# Patient Record
Sex: FEMALE | Race: WHITE | NOT HISPANIC OR LATINO | Employment: OTHER | URBAN - METROPOLITAN AREA
[De-identification: names, ages, dates, MRNs, and addresses within clinical notes are randomized per-mention and may not be internally consistent; named-entity substitution may affect disease eponyms.]

---

## 2019-11-12 ENCOUNTER — HOSPITAL ENCOUNTER (OUTPATIENT)
Dept: MAMMOGRAPHY | Facility: HOSPITAL | Age: 49
Discharge: HOME | End: 2019-11-12
Attending: NURSE PRACTITIONER

## 2020-09-22 ENCOUNTER — HOSPITAL ENCOUNTER (OUTPATIENT)
Dept: OTHER | Facility: HOSPITAL | Age: 50
Discharge: HOME | End: 2020-09-22
Attending: PSYCHIATRY & NEUROLOGY

## 2020-09-22 LAB
ALBUMIN SERPL-MCNC: 4.1 G/DL (ref 3.5–5)
ALBUMIN/GLOB SERPL: 1.4 {RATIO} (ref 1.4–2.6)
ALP SERPL-CCNC: 114 U/L (ref 42–98)
ALT SERPL-CCNC: 10 U/L (ref 10–40)
ANION GAP SERPL CALC-SCNC: 15 MMOL/L (ref 8–19)
AST SERPL-CCNC: 15 U/L (ref 15–50)
BASOPHILS # BLD AUTO: 0.11 10*3/UL (ref 0–0.2)
BASOPHILS NFR BLD AUTO: 1 % (ref 0–3)
BILIRUB SERPL-MCNC: 0.34 MG/DL (ref 0.2–1.3)
BUN SERPL-MCNC: 5 MG/DL (ref 5–25)
BUN/CREAT SERPL: 6 {RATIO} (ref 6–20)
CALCIUM SERPL-MCNC: 9.5 MG/DL (ref 8.7–10.4)
CHLORIDE SERPL-SCNC: 101 MMOL/L (ref 99–111)
CONV ABS IMM GRAN: 0.05 10*3/UL (ref 0–0.2)
CONV CO2: 27 MMOL/L (ref 22–32)
CONV IMMATURE GRAN: 0.4 % (ref 0–1.8)
CONV TOTAL PROTEIN: 7 G/DL (ref 6.3–8.2)
CREAT UR-MCNC: 0.78 MG/DL (ref 0.5–0.9)
DEPRECATED RDW RBC AUTO: 45.3 FL (ref 36.4–46.3)
EOSINOPHIL # BLD AUTO: 0.2 10*3/UL (ref 0–0.7)
EOSINOPHIL # BLD AUTO: 1.7 % (ref 0–7)
ERYTHROCYTE [DISTWIDTH] IN BLOOD BY AUTOMATED COUNT: 13.2 % (ref 11.7–14.4)
GFR SERPLBLD BASED ON 1.73 SQ M-ARVRAT: >60 ML/MIN/{1.73_M2}
GLOBULIN UR ELPH-MCNC: 2.9 G/DL (ref 2–3.5)
GLUCOSE SERPL-MCNC: 81 MG/DL (ref 65–99)
HCT VFR BLD AUTO: 41.2 % (ref 37–47)
HGB BLD-MCNC: 13.6 G/DL (ref 12–16)
LYMPHOCYTES # BLD AUTO: 3.68 10*3/UL (ref 1–5)
LYMPHOCYTES NFR BLD AUTO: 32 % (ref 20–45)
MCH RBC QN AUTO: 30.8 PG (ref 27–31)
MCHC RBC AUTO-ENTMCNC: 33 G/DL (ref 33–37)
MCV RBC AUTO: 93.2 FL (ref 81–99)
MONOCYTES # BLD AUTO: 0.97 10*3/UL (ref 0.2–1.2)
MONOCYTES NFR BLD AUTO: 8.4 % (ref 3–10)
NEUTROPHILS # BLD AUTO: 6.48 10*3/UL (ref 2–8)
NEUTROPHILS NFR BLD AUTO: 56.5 % (ref 30–85)
NRBC CBCN: 0 % (ref 0–0.7)
OSMOLALITY SERPL CALC.SUM OF ELEC: 284 MOSM/KG (ref 273–304)
PLATELET # BLD AUTO: 336 10*3/UL (ref 130–400)
PMV BLD AUTO: 9.6 FL (ref 9.4–12.3)
POTASSIUM SERPL-SCNC: 4.4 MMOL/L (ref 3.5–5.3)
RBC # BLD AUTO: 4.42 10*6/UL (ref 4.2–5.4)
SODIUM SERPL-SCNC: 139 MMOL/L (ref 135–147)
WBC # BLD AUTO: 11.49 10*3/UL (ref 4.8–10.8)

## 2020-11-11 ENCOUNTER — OFFICE VISIT CONVERTED (OUTPATIENT)
Dept: PODIATRY | Facility: CLINIC | Age: 50
End: 2020-11-11
Attending: PODIATRIST

## 2021-05-14 VITALS
OXYGEN SATURATION: 100 % | DIASTOLIC BLOOD PRESSURE: 92 MMHG | HEIGHT: 63 IN | TEMPERATURE: 97.1 F | HEART RATE: 87 BPM | WEIGHT: 144 LBS | SYSTOLIC BLOOD PRESSURE: 158 MMHG | BODY MASS INDEX: 25.52 KG/M2

## 2021-08-09 ENCOUNTER — TRANSCRIBE ORDERS (OUTPATIENT)
Dept: ADMINISTRATIVE | Facility: HOSPITAL | Age: 51
End: 2021-08-09

## 2021-08-09 DIAGNOSIS — Z12.31 ENCOUNTER FOR SCREENING MAMMOGRAM FOR MALIGNANT NEOPLASM OF BREAST: Primary | ICD-10-CM

## 2021-10-21 ENCOUNTER — HOSPITAL ENCOUNTER (OUTPATIENT)
Dept: MAMMOGRAPHY | Facility: HOSPITAL | Age: 51
Discharge: HOME | End: 2021-10-21
Admitting: NURSE PRACTITIONER

## 2021-10-21 DIAGNOSIS — Z12.31 ENCOUNTER FOR SCREENING MAMMOGRAM FOR MALIGNANT NEOPLASM OF BREAST: ICD-10-CM

## 2021-10-21 PROCEDURE — 77067 SCR MAMMO BI INCL CAD: CPT

## 2021-10-21 PROCEDURE — 77063 BREAST TOMOSYNTHESIS BI: CPT

## 2023-08-01 ENCOUNTER — TRANSCRIBE ORDERS (OUTPATIENT)
Dept: ADMINISTRATIVE | Facility: HOSPITAL | Age: 53
End: 2023-08-01
Payer: MEDICARE

## 2023-08-01 DIAGNOSIS — Z12.31 SCREENING MAMMOGRAM FOR HIGH-RISK PATIENT: Primary | ICD-10-CM

## 2024-07-31 ENCOUNTER — TRANSCRIBE ORDERS (OUTPATIENT)
Dept: ADMINISTRATIVE | Facility: HOSPITAL | Age: 54
End: 2024-07-31
Payer: MEDICARE

## 2024-07-31 DIAGNOSIS — Z12.39 ENCOUNTER FOR OTHER SCREENING FOR MALIGNANT NEOPLASM OF BREAST: Primary | ICD-10-CM

## 2024-08-09 ENCOUNTER — TRANSCRIBE ORDERS (OUTPATIENT)
Dept: ADMINISTRATIVE | Facility: HOSPITAL | Age: 54
End: 2024-08-09
Payer: MEDICARE

## 2024-08-09 DIAGNOSIS — F17.200 TOBACCO USE DISORDER: Primary | ICD-10-CM

## 2024-08-15 ENCOUNTER — HOSPITAL ENCOUNTER (OUTPATIENT)
Dept: MAMMOGRAPHY | Facility: HOSPITAL | Age: 54
Discharge: HOME | End: 2024-08-15
Admitting: NURSE PRACTITIONER
Payer: MEDICARE

## 2024-08-15 DIAGNOSIS — Z12.39 ENCOUNTER FOR OTHER SCREENING FOR MALIGNANT NEOPLASM OF BREAST: ICD-10-CM

## 2024-08-15 PROCEDURE — 77063 BREAST TOMOSYNTHESIS BI: CPT

## 2024-08-15 PROCEDURE — 77067 SCR MAMMO BI INCL CAD: CPT

## 2024-08-23 ENCOUNTER — HOSPITAL ENCOUNTER (OUTPATIENT)
Dept: CT IMAGING | Facility: HOSPITAL | Age: 54
Discharge: HOME | End: 2024-08-23
Payer: MEDICARE

## 2024-08-23 DIAGNOSIS — F17.200 TOBACCO USE DISORDER: ICD-10-CM

## 2024-08-23 PROCEDURE — 71271 CT THORAX LUNG CANCER SCR C-: CPT

## 2025-07-15 ENCOUNTER — TRANSCRIBE ORDERS (OUTPATIENT)
Dept: ADMINISTRATIVE | Facility: HOSPITAL | Age: 55
End: 2025-07-15
Payer: MEDICARE

## 2025-07-15 DIAGNOSIS — Z12.31 VISIT FOR SCREENING MAMMOGRAM: Primary | ICD-10-CM

## 2025-07-21 ENCOUNTER — TELEPHONE (OUTPATIENT)
Dept: ORTHOPEDIC SURGERY | Facility: CLINIC | Age: 55
End: 2025-07-21
Payer: MEDICARE

## 2025-07-21 ENCOUNTER — HOSPITAL ENCOUNTER (EMERGENCY)
Facility: HOSPITAL | Age: 55
Discharge: HOME | End: 2025-07-21
Attending: EMERGENCY MEDICINE | Admitting: EMERGENCY MEDICINE
Payer: MEDICARE

## 2025-07-21 ENCOUNTER — APPOINTMENT (OUTPATIENT)
Dept: GENERAL RADIOLOGY | Facility: HOSPITAL | Age: 55
End: 2025-07-21
Payer: MEDICARE

## 2025-07-21 VITALS
WEIGHT: 140.43 LBS | SYSTOLIC BLOOD PRESSURE: 139 MMHG | RESPIRATION RATE: 19 BRPM | HEART RATE: 90 BPM | BODY MASS INDEX: 24.88 KG/M2 | DIASTOLIC BLOOD PRESSURE: 82 MMHG | HEIGHT: 63 IN | TEMPERATURE: 98.1 F | OXYGEN SATURATION: 100 %
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
## 2025-07-21 DIAGNOSIS — S62.025A CLOSED NONDISPLACED FRACTURE OF MIDDLE THIRD OF SCAPHOID BONE OF LEFT WRIST, INITIAL ENCOUNTER: ICD-10-CM

## 2025-07-21 DIAGNOSIS — S63.502A SPRAIN OF LEFT WRIST, INITIAL ENCOUNTER: Primary | ICD-10-CM

## 2025-07-21 PROCEDURE — 73110 X-RAY EXAM OF WRIST: CPT

## 2025-07-21 PROCEDURE — 99283 EMERGENCY DEPT VISIT LOW MDM: CPT

## 2025-07-21 RX ORDER — ATORVASTATIN CALCIUM 80 MG/1
1 TABLET, FILM COATED ORAL DAILY
COMMUNITY

## 2025-07-21 RX ORDER — CHLORTHALIDONE 25 MG/1
1 TABLET ORAL DAILY
COMMUNITY

## 2025-07-21 RX ORDER — CLONAZEPAM 0.5 MG/1
0.5 TABLET ORAL
COMMUNITY
Start: 2025-06-16

## 2025-07-21 RX ORDER — CLINDAMYCIN PHOSPHATE 11.9 MG/ML
SOLUTION TOPICAL SEE ADMIN INSTRUCTIONS
COMMUNITY

## 2025-07-21 RX ORDER — SUMATRIPTAN SUCCINATE 100 MG/1
100 TABLET ORAL
COMMUNITY

## 2025-07-21 RX ORDER — LISINOPRIL 40 MG/1
1 TABLET ORAL DAILY
COMMUNITY

## 2025-07-21 RX ORDER — DICLOFENAC SODIUM 75 MG/1
75 TABLET, DELAYED RELEASE ORAL 2 TIMES DAILY
COMMUNITY

## 2025-07-21 RX ORDER — PROPRANOLOL HYDROCHLORIDE 120 MG/1
120 CAPSULE, EXTENDED RELEASE ORAL DAILY
COMMUNITY
Start: 2025-06-16

## 2025-07-21 RX ORDER — LEVOTHYROXINE SODIUM 75 UG/1
1 TABLET ORAL DAILY
COMMUNITY

## 2025-07-21 RX ORDER — AMLODIPINE BESYLATE 5 MG/1
10 TABLET ORAL DAILY
COMMUNITY

## 2025-07-21 NOTE — TELEPHONE ENCOUNTER
Closed nondisplaced fracture of middle third of scaphoid bone of left wrist, initial encounter  - XRAY 7/21/25 - PN 7/21/25 ER

## 2025-07-21 NOTE — ED PROVIDER NOTES
Time: 8:41 AM EDT  Date of encounter:  7/21/2025  Independent Historian/Clinical History and Information was obtained by:   Patient and Family    History is limited by: N/A    Chief Complaint: Fall onto outstretched left hand, wrist pain      History of Present Illness:  Patient is a 54 y.o. year old female with history of MS occasional falls who presents to the emergency department for evaluation of stumble and fall onto her knees and outstretched left hand, causing wrist pain.    She now has an aching or throbbing pain along the radial aspect of the left wrist and mild swelling.    She wanted to make sure she did not fracture anything.    To a lesser extent she also has some bumps and bruises on her bilateral anterior knees and right elbow.  No significant head injury or loss of consciousness or neck pain.  Not on blood thinners.      Patient Care Team  Primary Care Provider: Meghan Montes APRN    Past Medical History:     Allergies   Allergen Reactions    Interferon Beta-1a Hives     Past Medical History:   Diagnosis Date    Hypertension     MS (multiple sclerosis)      History reviewed. No pertinent surgical history.  History reviewed. No pertinent family history.    Home Medications:  Prior to Admission medications    Medication Sig Start Date End Date Taking? Authorizing Provider   amitriptyline (ELAVIL) 25 MG tablet Take 1 tablet by mouth At Night As Needed. 6/16/25  Yes Abdiaziz Ty MD   clonazePAM (KlonoPIN) 0.5 MG tablet Take 1 tablet by mouth. 6/16/25  Yes Abdiaziz Ty MD   propranolol LA (INDERAL LA) 120 MG 24 hr capsule Take 1 capsule by mouth Daily. 6/16/25  Yes Abdiaziz Ty MD   amLODIPine (NORVASC) 5 MG tablet Take 1 tablet by mouth Daily.    Abdiaziz Ty MD   atorvastatin (LIPITOR) 80 MG tablet Take 1 tablet by mouth Daily.    Abdiaziz Ty MD   chlorthalidone (HYGROTON) 25 MG tablet Take 1 tablet by mouth Daily.    Abdiaziz Ty MD  "  clindamycin (CLEOCIN T) 1 % external solution Apply  topically to the appropriate area as directed See Admin Instructions. Apply topically to the affected area twice daily    Abdiaziz Ty MD   diclofenac (VOLTAREN) 75 MG EC tablet Take 1 tablet by mouth 2 (Two) Times a Day.    Abdiaziz Ty MD   levothyroxine (SYNTHROID, LEVOTHROID) 75 MCG tablet Take 1 tablet by mouth Daily.    Abdiaziz Ty MD   lisinopril (PRINIVIL,ZESTRIL) 40 MG tablet Take 1 tablet by mouth Daily.    Abdiaziz Ty MD   SUMAtriptan (IMITREX) 100 MG tablet Take 1 tablet by mouth Every 2 (Two) Hours As Needed.    Abdiaziz Ty MD        Social History:   Social History     Tobacco Use    Smoking status: Unknown   Substance Use Topics    Drug use: Defer         Review of Systems:  Review of Systems   I performed a 10 point review of systems which was all negative, except for the positives found in the HPI above.  Physical Exam:  /82 (BP Location: Right arm, Patient Position: Sitting)   Pulse 90   Temp 98.1 °F (36.7 °C) (Oral)   Resp 19   Ht 160 cm (63\")   Wt 63.7 kg (140 lb 6.9 oz)   LMP  (LMP Unknown)   SpO2 100%   BMI 24.88 kg/m²     Physical Exam   General: Awake alert and in mild to moderate pain holding left wrist and pain  HEENT: Head normocephalic atraumatic, eyes PERRLA EOMI, nose normal, oropharynx normal.    Neck: Supple full range of motion, no meningismus, no lymphadenopathy    Heart: Regular rate and rhythm, no murmurs or rubs, 2+ radial pulses bilaterally    Lungs: Clear to auscultation bilaterally without wheezes or crackles, no respiratory distress    Abdomen: Soft, nontender, nondistended, no rebound or guarding    Skin: Warm, dry, no rash    Musculoskeletal: Mildly reduced range of motion due to pain and swelling of the left wrist but she is neurovascular intact distally, she has reproducible tenderness over the anatomic snuffbox/scaphoid bone, no lower extremity edema, knee " exam notable for some superficial bruising to the anterior knee or patella but no significant tenderness or pain with range of motion.    Neurologic: Oriented x3, no motor deficits no sensory deficits    Psychiatric: Mood appears stable, no psychosis            Medical Decision Making:      Comorbidities that affect care:    MS, frequent falls    External Notes reviewed:    None      The following orders were placed and all results were independently analyzed by me:  Orders Placed This Encounter   Procedures    XR Wrist 3+ View Left    Ambulatory Referral to Orthopedic Surgery    Obtain & Apply The Following- Upper extremity; Wrist cock-up       Medications Given in the Emergency Department:  Medications - No data to display     ED Course:         Labs:    Lab Results (last 24 hours)       ** No results found for the last 24 hours. **             Imaging:    XR Wrist 3+ View Left  Result Date: 7/21/2025  XR WRIST 3+ VW LEFT Date of Exam: 7/21/2025 7:40 AM EDT Indication: Fall Comparison: None available. Findings: Evaluation of the left wrist demonstrates subtle transverse trabecular lucency through the body of the scaphoid noted on the AP view only. Fracture cannot be confirmed on additional views but there is some dorsal soft tissue swelling noted. Remaining carpal bones appear intact. The distal radius and ulna are grossly unremarkable.     Impression: 1. Questionable nondisplaced fracture through the body of the scaphoid. Correlate with physical examination. Dedicated scaphoid repeat views could be considered or if there is high clinical suspicion confirmation with CT could be obtained. Electronically Signed: Bianca Tovar MD  7/21/2025 8:13 AM EDT  Workstation ID: [id number]        Differential Diagnosis and Discussion:    Orthopedic Injuries: Differential diagnosis includes but is not limited to fractures, soft tissue injuries, dislocations, contusions, ligamentous injuries, tendon injuries, nerve injuries,  compartment syndrome, bursitis, and vascular injuries.    PROCEDURES:    X-ray were performed in the emergency department and all X-ray impressions were independently interpreted by me.    No orders to display       Procedures    MDM     Amount and/or Complexity of Data Reviewed  Tests in the radiology section of CPT®: reviewed           This patient is a 54-year-old female with MS and frequent falls who stumbled and fell injuring her left wrist.    She has tenderness over the anatomic snuffbox and I reviewed and interpreted her x-rays of the wrist with one of the views showing possible nondisplaced or hairline fracture of the scaphoid.    Since she is tender there on exam, I will go ahead and immobilize her in a wrist splint and give her recommendations for RICE, NSAIDs, follow-up with orthopedic surgery and I will place a referral.                Patient Care Considerations:          Consultants/Shared Management Plan:        Social Determinants of Health:    Patient has presented with family members who are responsible, reliable and will ensure follow up care.      Disposition and Care Coordination:    Discharged: The patient is suitable and stable for discharge with no need for consideration of admission.    I have explained the patient´s condition, diagnoses and treatment plan based on the information available to me at this time. I have answered questions and addressed any concerns. The patient has a good  understanding of the patient´s diagnosis, condition, and treatment plan as can be expected at this point. The vital signs have been stable. The patient´s condition is stable and appropriate for discharge from the emergency department.      The patient will pursue further outpatient evaluation with the primary care physician or other designated or consulting physician as outlined in the discharge instructions. They are agreeable to this plan of care and follow-up instructions have been explained in detail.  The patient has received these instructions in written format and has expressed an understanding of the discharge instructions. The patient is aware that any significant change in condition or worsening of symptoms should prompt an immediate return to this or the closest emergency department or call to 911.  I have explained discharge medications and the need for follow up with the patient/caretakers. This was also printed in the discharge instructions. Patient was discharged with the following medications and follow up:      Medication List      No changes were made to your prescriptions during this visit.      No follow-up provider specified.     Final diagnoses:   Sprain of left wrist, initial encounter   Closed nondisplaced fracture of middle third of scaphoid bone of left wrist, initial encounter   Fall on same level from slipping, tripping or stumbling, initial encounter        ED Disposition       ED Disposition   Discharge    Condition   Stable    Comment   --               This medical record created using voice recognition software.             Juan Manuel Manning MD  07/21/25 [id number]

## 2025-07-21 NOTE — DISCHARGE INSTRUCTIONS
Your x-rays look like you may have a hairline fracture of the scaphoid bone in the left wrist for which you should keep the wrist immobilized in the splint for now and call orthopedic surgery clinic for a follow-up appointment to be seen.    You can also take some Tylenol or ibuprofen for pain and ice packs and elevate the wrist because you definitely also have some sprained ligaments that will take time to heal.

## 2025-07-25 ENCOUNTER — TRANSCRIBE ORDERS (OUTPATIENT)
Dept: ADMINISTRATIVE | Facility: HOSPITAL | Age: 55
End: 2025-07-25
Payer: MEDICARE

## 2025-07-25 ENCOUNTER — OFFICE VISIT (OUTPATIENT)
Dept: ORTHOPEDIC SURGERY | Facility: CLINIC | Age: 55
End: 2025-07-25
Payer: MEDICARE

## 2025-07-25 VITALS
DIASTOLIC BLOOD PRESSURE: 91 MMHG | BODY MASS INDEX: 24.8 KG/M2 | HEART RATE: 109 BPM | OXYGEN SATURATION: 96 % | WEIGHT: 140 LBS | HEIGHT: 63 IN | SYSTOLIC BLOOD PRESSURE: 159 MMHG

## 2025-07-25 DIAGNOSIS — S62.002A CLOSED NONDISPLACED FRACTURE OF SCAPHOID OF LEFT WRIST, UNSPECIFIED PORTION OF SCAPHOID, INITIAL ENCOUNTER: Primary | ICD-10-CM

## 2025-07-25 DIAGNOSIS — S62.102S FRACTURE OF UNSPECIFIED CARPAL BONE, LEFT WRIST, SEQUELA: Primary | ICD-10-CM

## 2025-07-25 NOTE — PROGRESS NOTES
"Chief Complaint  Initial Evaluation of the Left Wrist       Subjective      Imani Meier presents to [facility] for an evaluation  of her left wrist. She states she has balance issues and vertigo and states she fell and landed on her left wrist. She went to the emergency room on 07/21/25 where she had x-rays done and placed into a brace. She denies any prior surgery to her left wrist.     Allergies   Allergen Reactions    Interferon Beta-1a Hives        Social History     Socioeconomic History    Marital status:    Tobacco Use    Smoking status: Every Day     Types: Cigarettes     Passive exposure: Current    Smokeless tobacco: Never   Vaping Use    Vaping status: Some Days    Substances: Nicotine   Substance and Sexual Activity    Alcohol use: Never    Drug use: Defer    Sexual activity: Defer        I reviewed the patient's chief complaint, history of present illness, review of systems, past medical history, surgical history, family history, social history, medications, and allergy list.     Review of Systems     Constitutional: Denies fevers, chills, weight loss  Cardiovascular: Denies chest pain, shortness of breath  Skin: Denies rashes, acute skin changes  Neurologic: Denies headache, loss of consciousness  MSK: left wrist pain       Vital Signs:   /91   Pulse 109   Ht 160 cm (63\")   Wt 63.5 kg (140 lb)   SpO2 96%   BMI 24.80 kg/m²          Physical Exam  General: Alert. No acute distress    Ortho Exam        Left upper extremity: limited range of motion secondary to pain to her left wrist, tender to palpation to the wrist, able to wiggle her fingers and thumb, neurovascularly intact, positive  pulses, sensation intact to the medial, radial and ulnar nerve        Orthopedic Injury Treatment    Date/Time: 7/25/2025 1:59 PM    Performed by: Deb Ji MA  Authorized by: Miky Lezama MD  Injury location: hand  Location details: left hand  Pre-procedure " neurovascular assessment: neurovascularly intact    Anesthesia:  Local anesthesia used: no    Sedation:  Patient sedated: no    Immobilization: cast  Splint type: thumb spica  Supplies used: cotton padding (fiberglass)  Post-procedure neurovascular assessment: post-procedure neurovascularly intact  Patient tolerance: patient tolerated the procedure well with no immediate complications  Comments: Closed treatment was obtained and fiberglass cast was applied.  The patient tolerated the procedure without any complications.            Imaging Results (Most Recent)       None             Result Review :       XR Wrist 3+ View Left  Result Date: 7/21/2025  Narrative: XR WRIST 3+ VW LEFT Date of Exam: 7/21/2025 7:40 AM EDT Indication: Fall Comparison: None available. Findings: Evaluation of the left wrist demonstrates subtle transverse trabecular lucency through the body of the scaphoid noted on the AP view only. Fracture cannot be confirmed on additional views but there is some dorsal soft tissue swelling noted. Remaining carpal bones appear intact. The distal radius and ulna are grossly unremarkable.     Impression: Impression: 1. Questionable nondisplaced fracture through the body of the scaphoid. Correlate with physical examination. Dedicated scaphoid repeat views could be considered or if there is high clinical suspicion confirmation with CT could be obtained. Electronically Signed: Bianca Tovar MD  7/21/2025 8:13 AM EDT  Workstation ID: [id number]             Assessment and Plan     Diagnoses and all orders for this visit:    1. Closed nondisplaced fracture of scaphoid of left wrist, unspecified portion of scaphoid, initial encounter (Primary)        The patient presents here today for an evaluation  of her left wrist.     She will be placed into a thumb spica cast today in the office. Cast care was discussed and reviewed with the patient today in the office.     She will continue current medications for pain  control.      Will obtain X-Rays of left wrist out of the cast with scaphoid view at next visit.    Call or return if worsening symptoms.    Follow Up     return in 3 weeks for follow up fracture care/ management.         Patient was given instructions and counseling regarding her condition or for health maintenance advice. Please see specific information pulled into the AVS if appropriate.     Scribed for Miky Lezama MD by Heather Gagnon.  07/25/25   13:07 EDT    I have personally performed the services described in this document as scribed by the above individual and it is both accurate and complete. Miky Lezama MD 07/29/25

## 2025-08-18 ENCOUNTER — OFFICE VISIT (OUTPATIENT)
Dept: ORTHOPEDIC SURGERY | Facility: CLINIC | Age: 55
End: 2025-08-18
Payer: MEDICARE

## 2025-08-18 VITALS
BODY MASS INDEX: 24.8 KG/M2 | DIASTOLIC BLOOD PRESSURE: 91 MMHG | SYSTOLIC BLOOD PRESSURE: 149 MMHG | WEIGHT: 140 LBS | OXYGEN SATURATION: 97 % | HEIGHT: 63 IN | HEART RATE: 111 BPM

## 2025-08-18 DIAGNOSIS — S62.025D CLOSED NONDISPLACED FRACTURE OF MIDDLE THIRD OF SCAPHOID OF LEFT WRIST WITH ROUTINE HEALING, SUBSEQUENT ENCOUNTER: Primary | ICD-10-CM

## 2025-08-18 PROCEDURE — 1160F RVW MEDS BY RX/DR IN RCRD: CPT

## 2025-08-18 PROCEDURE — 1159F MED LIST DOCD IN RCRD: CPT

## 2025-08-18 PROCEDURE — 99024 POSTOP FOLLOW-UP VISIT: CPT

## 2025-08-19 ENCOUNTER — HOSPITAL ENCOUNTER (OUTPATIENT)
Dept: BONE DENSITY | Facility: HOSPITAL | Age: 55
Discharge: HOME | End: 2025-08-19
Payer: MEDICARE

## 2025-08-19 ENCOUNTER — HOSPITAL ENCOUNTER (OUTPATIENT)
Dept: MAMMOGRAPHY | Facility: HOSPITAL | Age: 55
Discharge: HOME | End: 2025-08-19
Payer: MEDICARE

## 2025-08-19 DIAGNOSIS — Z12.31 VISIT FOR SCREENING MAMMOGRAM: ICD-10-CM

## 2025-08-19 DIAGNOSIS — S62.102S FRACTURE OF UNSPECIFIED CARPAL BONE, LEFT WRIST, SEQUELA: ICD-10-CM

## 2025-08-19 PROCEDURE — 77080 DXA BONE DENSITY AXIAL: CPT

## 2025-08-19 PROCEDURE — 77063 BREAST TOMOSYNTHESIS BI: CPT

## 2025-08-19 PROCEDURE — 77067 SCR MAMMO BI INCL CAD: CPT

## 2025-08-20 ENCOUNTER — TRANSCRIBE ORDERS (OUTPATIENT)
Dept: ADMINISTRATIVE | Facility: HOSPITAL | Age: 55
End: 2025-08-20
Payer: MEDICARE

## 2025-08-20 DIAGNOSIS — R92.2 INCONCLUSIVE MAMMOGRAM: Primary | ICD-10-CM
